# Patient Record
Sex: MALE | Race: WHITE
[De-identification: names, ages, dates, MRNs, and addresses within clinical notes are randomized per-mention and may not be internally consistent; named-entity substitution may affect disease eponyms.]

---

## 2018-08-13 ENCOUNTER — HOSPITAL ENCOUNTER (OUTPATIENT)
Dept: HOSPITAL 35 - ULTRA | Age: 38
Discharge: HOME | End: 2018-08-13
Attending: OTOLARYNGOLOGY
Payer: COMMERCIAL

## 2018-08-13 DIAGNOSIS — Z79.899: ICD-10-CM

## 2018-08-13 DIAGNOSIS — I10: ICD-10-CM

## 2018-08-13 DIAGNOSIS — D11.0: Primary | ICD-10-CM

## 2024-06-05 ENCOUNTER — OFFICE VISIT (OUTPATIENT)
Dept: CARDIOLOGY | Facility: CLINIC | Age: 44
End: 2024-06-05
Payer: COMMERCIAL

## 2024-06-05 VITALS
WEIGHT: 218.6 LBS | BODY MASS INDEX: 31.3 KG/M2 | HEIGHT: 70 IN | HEART RATE: 59 BPM | DIASTOLIC BLOOD PRESSURE: 88 MMHG | SYSTOLIC BLOOD PRESSURE: 120 MMHG

## 2024-06-05 DIAGNOSIS — E78.2 MIXED HYPERLIPIDEMIA: ICD-10-CM

## 2024-06-05 DIAGNOSIS — I10 ESSENTIAL HYPERTENSION: ICD-10-CM

## 2024-06-05 DIAGNOSIS — Z82.41 FAMILY HISTORY OF SUDDEN CARDIAC DEATH: Primary | ICD-10-CM

## 2024-06-05 PROCEDURE — 93000 ELECTROCARDIOGRAM COMPLETE: CPT | Performed by: INTERNAL MEDICINE

## 2024-06-05 PROCEDURE — 99204 OFFICE O/P NEW MOD 45 MIN: CPT | Performed by: INTERNAL MEDICINE

## 2024-06-05 RX ORDER — ATENOLOL 25 MG/1
1 TABLET ORAL DAILY
COMMUNITY
Start: 2024-04-12

## 2024-06-05 RX ORDER — SIMVASTATIN 20 MG
20 TABLET ORAL NIGHTLY
COMMUNITY

## 2024-06-05 RX ORDER — AMLODIPINE BESYLATE 5 MG/1
5 TABLET ORAL DAILY
COMMUNITY

## 2024-06-05 RX ORDER — ASPIRIN 81 MG/1
81 TABLET ORAL DAILY
COMMUNITY

## 2024-06-05 NOTE — PROGRESS NOTES
[facility]      Patient Name: Riki Arana  :10/2/1978  Age: 45 y.o.  Encounter Provider:  Ari Agarwal Jr, MD      Chief Complaint:   Chief Complaint   Patient presents with    FAMILY HISTORY OF MYOCARDIAL INFARCTION         HPI  Riki Arana is a 45 y.o. male with family history of presumed fatal MI with personal history of essential hypertension mixed hyperlipidemia who presents for initial evaluation.   patient's father  at home of presumed myocardial infarction.  He did not make it to the hospital and there was no autopsy to confirm.  He was a diabetic for 10 years prior to this and he has a history of paternal and maternal grand parents with MI in their 60s.  He coaches volleyball and plays weekly with no chest pain or shortness of air.  No orthopnea, PND or edema.  He had palpitations in his late 20s and was placed on atenolol and has no symptoms on the medication.  Blood pressure and heart rate are well-controlled today in clinic.  He is a lifelong non-smoker who drinks rarely and denies illicit drug use.      The following portions of the patient's history were reviewed and updated as appropriate: allergies, current medications, past family history, past medical history, past social history, past surgical history and problem list.      Review of Systems   Constitutional: Negative for chills and fever.   HENT:  Negative for hoarse voice and sore throat.    Eyes:  Negative for double vision and photophobia.   Cardiovascular:  Negative for chest pain, leg swelling, near-syncope, orthopnea, palpitations, paroxysmal nocturnal dyspnea and syncope.   Respiratory:  Negative for cough and wheezing.    Skin:  Negative for poor wound healing and rash.   Musculoskeletal:  Negative for arthritis and joint swelling.   Gastrointestinal:  Negative for bloating, abdominal pain, hematemesis and hematochezia.   Neurological:  Negative for dizziness and focal weakness.  "  Psychiatric/Behavioral:  Negative for depression and suicidal ideas.      OBJECTIVE:   Vital Signs  Vitals:    06/05/24 1048   BP: 120/88   Pulse: 59     Estimated body mass index is 31.37 kg/m² as calculated from the following:    Height as of this encounter: 177.8 cm (70\").    Weight as of this encounter: 99.2 kg (218 lb 9.6 oz).    Vitals reviewed.   Constitutional:       Appearance: Healthy appearance. Not in distress.   Neck:      Vascular: No JVR. JVD normal.   Pulmonary:      Effort: Pulmonary effort is normal.      Breath sounds: Normal breath sounds. No wheezing. No rhonchi. No rales.   Chest:      Chest wall: Not tender to palpatation.   Cardiovascular:      PMI at left midclavicular line. Normal rate. Regular rhythm. Normal S1. Normal S2.       Murmurs: There is no murmur.      No gallop.  No click. No rub.   Pulses:     Intact distal pulses.   Edema:     Peripheral edema absent.   Abdominal:      General: Bowel sounds are normal.      Palpations: Abdomen is soft.      Tenderness: There is no abdominal tenderness.   Musculoskeletal: Normal range of motion.         General: No tenderness. Skin:     General: Skin is warm and dry.   Neurological:      General: No focal deficit present.      Mental Status: Alert and oriented to person, place and time.       ECG 12 Lead    Date/Time: 6/5/2024 10:56 AM  Performed by: Ari Agarwal Jr., MD    Authorized by: Ari Agarwal Jr., MD  Comparison: not compared with previous ECG   Previous ECG: no previous ECG available  Rhythm: sinus rhythm  Other findings: non-specific ST-T wave changes    Clinical impression: non-specific ECG           No results found for: \"BUN\", \"CREATININE\", \"K\", \"ALT\", \"AST\"        ASSESSMENT:     43-year-old male with family history of sudden cardiac death presents for initial evaluation      PLAN OF CARE:     Family history of CAD/SCD -patient is concerned about his cardiac risk given his family history.  He has a normal EKG with no " concerning symptoms.  We will plan for coronary calcium score at [facility].  I would have a low threshold for treadmill stress testing if his calcium score is greater than 100.  This would also dictate a need for more aggressive cholesterol lowering with a target goal of less than 70 if greater than 100.  He is already on an aspirin which she started after COVID diagnosis and has continued.  If his calcium score is very low I do not think he meets criteria for aspirin therapy but we will evaluate once diagnostic testing is reported.  Essential hypertension -well-controlled at this time.  Continue current medications and sodium restricted diet.  Mixed hyperlipidemia -last LDL was 101.  If calcium score is greater than 100 we will push for more strict goals.    Return to clinic 12 months             Discharge Medications            Accurate as of June 5, 2024 10:55 AM. If you have any questions, ask your nurse or doctor.                Continue These Medications        Instructions Start Date   amLODIPine 5 MG tablet  Commonly known as: NORVASC   5 mg, Oral, Daily      aspirin 81 MG EC tablet   81 mg, Oral, Daily      atenolol 25 MG tablet  Commonly known as: TENORMIN   1 tablet, Oral, Daily      simvastatin 20 MG tablet  Commonly known as: ZOCOR   20 mg, Oral, Nightly               Thank you for allowing me to participate in the care of your patient,      Sincerely,   Ari Agarwal MD  [facility]  06/05/24  10:55 EDT

## 2024-08-09 ENCOUNTER — HOSPITAL ENCOUNTER (OUTPATIENT)
Dept: CT IMAGING | Facility: HOSPITAL | Age: 44
Discharge: HOME | End: 2024-08-09
Admitting: INTERNAL MEDICINE

## 2024-08-09 DIAGNOSIS — Z82.41 FAMILY HISTORY OF SUDDEN CARDIAC DEATH: ICD-10-CM

## 2024-08-09 PROCEDURE — 75571 CT HRT W/O DYE W/CA TEST: CPT

## 2024-08-12 ENCOUNTER — TELEPHONE (OUTPATIENT)
Dept: CARDIOLOGY | Facility: CLINIC | Age: 44
End: 2024-08-12
Payer: COMMERCIAL

## 2024-08-12 NOTE — TELEPHONE ENCOUNTER
----- Message from Ari Agarwal sent at 8/12/2024  3:43 PM EDT -----  Please let the patient know this is a low calcium score.  No indication for further testing at this time and no change in medications planned.

## 2024-08-12 NOTE — PROGRESS NOTES
Please let the patient know this is a low calcium score.  No indication for further testing at this time and no change in medications planned.

## 2024-08-12 NOTE — TELEPHONE ENCOUNTER
Left voicemail for Riki Arana requesting callback.    HUB: please transfer to Triage if patient returns call    Thank you,  Heather HYMAN RN  Triage Nurse [facility]   16:01 EDT

## 2024-08-12 NOTE — TELEPHONE ENCOUNTER
Riki Arana returned call.    Reviewed results and recommendations with patient and he verbalized understanding of results and recommendations.    Thank you,  Heather HYMAN RN  Triage Nurse [facility]   16:23 EDT

## 2025-06-10 ENCOUNTER — OFFICE VISIT (OUTPATIENT)
Dept: CARDIOLOGY | Age: 45
End: 2025-06-10
Payer: COMMERCIAL

## 2025-06-10 VITALS
SYSTOLIC BLOOD PRESSURE: 122 MMHG | WEIGHT: 223.8 LBS | DIASTOLIC BLOOD PRESSURE: 80 MMHG | HEART RATE: 57 BPM | HEIGHT: 70 IN | BODY MASS INDEX: 32.04 KG/M2

## 2025-06-10 DIAGNOSIS — I10 ESSENTIAL HYPERTENSION: ICD-10-CM

## 2025-06-10 DIAGNOSIS — E78.2 MIXED HYPERLIPIDEMIA: ICD-10-CM

## 2025-06-10 DIAGNOSIS — Z82.41 FAMILY HISTORY OF SUDDEN CARDIAC DEATH: Primary | ICD-10-CM

## 2025-06-10 RX ORDER — ESOMEPRAZOLE MAGNESIUM 20 MG/1
20 GRANULE, DELAYED RELEASE ORAL
COMMUNITY
Start: 2025-02-01

## 2025-06-10 NOTE — PROGRESS NOTES
[facility]      Patient Name: Riki Arana  :1980  Age: 44 y.o.  Encounter Provider:  Ari Agarwal Jr, MD      Chief Complaint:   Chief Complaint   Patient presents with    Family history of sudden cardiac death         HPI  Riki Arana is a 44 y.o. male with family history of presumed fatal MI with personal history of essential hypertension mixed hyperlipidemia who presents for fu evaluation.       Last clinic visit note: Patient's father  at home of presumed myocardial infarction.  He did not make it to the hospital and there was no autopsy to confirm.  He was a diabetic for 10 years prior to this and he has a history of paternal and maternal grand parents with MI in their 60s.  He coaches volleyball and plays weekly with no chest pain or shortness of air.  No orthopnea, PND or edema.  He had palpitations in his late 20s and was placed on atenolol and has no symptoms on the medication.  Blood pressure and heart rate are well-controlled today in clinic.  He is a lifelong non-smoker who drinks rarely and denies illicit drug use.    Coronary calcium score of 25.  No indication for further testing.  Patient remains on aspirin and statin.  He is active with no limiting complaints.  EKG today shows sinus rhythm with no concerning repolarization abnormalities.    The following portions of the patient's history were reviewed and updated as appropriate: allergies, current medications, past family history, past medical history, past social history, past surgical history and problem list.      Review of Systems   Constitutional: Negative for chills and fever.   HENT:  Negative for hoarse voice and sore throat.    Eyes:  Negative for double vision and photophobia.   Cardiovascular:  Negative for chest pain, leg swelling, near-syncope, orthopnea, palpitations, paroxysmal nocturnal dyspnea and syncope.   Respiratory:  Negative for cough and wheezing.    Skin:  Negative for  "poor wound healing and rash.   Musculoskeletal:  Negative for arthritis and joint swelling.   Gastrointestinal:  Negative for bloating, abdominal pain, hematemesis and hematochezia.   Neurological:  Negative for dizziness and focal weakness.   Psychiatric/Behavioral:  Negative for depression and suicidal ideas.        OBJECTIVE:   Vital Signs  Vitals:    06/10/25 1040   BP: 122/80   Pulse: 57     Estimated body mass index is 32.11 kg/m² as calculated from the following:    Height as of this encounter: 177.8 cm (70\").    Weight as of this encounter: 102 kg (223 lb 12.8 oz).    Vitals reviewed.   Constitutional:       Appearance: Healthy appearance. Not in distress.   Neck:      Vascular: No JVR. JVD normal.   Pulmonary:      Effort: Pulmonary effort is normal.      Breath sounds: Normal breath sounds. No wheezing. No rhonchi. No rales.   Chest:      Chest wall: Not tender to palpatation.   Cardiovascular:      PMI at left midclavicular line. Normal rate. Regular rhythm. Normal S1. Normal S2.       Murmurs: There is no murmur.      No gallop.  No click. No rub.   Pulses:     Intact distal pulses.   Edema:     Peripheral edema absent.   Abdominal:      General: Bowel sounds are normal.      Palpations: Abdomen is soft.      Tenderness: There is no abdominal tenderness.   Musculoskeletal: Normal range of motion.         General: No tenderness. Skin:     General: Skin is warm and dry.   Neurological:      General: No focal deficit present.      Mental Status: Alert and oriented to person, place and time.       ECG 12 Lead    Date/Time: 6/10/2025 10:46 AM  Performed by: Ari Agarwal Jr., MD    Authorized by: Ari Agarwal Jr., MD  Comparison: compared with previous ECG from 6/5/2024  Similar to previous ECG  Rhythm: sinus rhythm    Clinical impression: normal ECG           No results found for: \"BUN\", \"CREATININE\", \"K\", \"ALT\", \"AST\"        ASSESSMENT:     44-year-old male with family history of sudden cardiac death " presents for initial evaluation      PLAN OF CARE:     Family history of CAD/SCD -low calcium score.  Patient is already on aspirin and statin.  No indication for aggressive LDL lowering goals at this time.  I suggest we repeat calcium score in 5 years.  I offered the patient multiple follow-up strategies and he would prefer to call me if there are any new problems.  Essential hypertension -well-controlled at this time.  Continue current medications and sodium restricted diet.  Mixed hyperlipidemia -last LDL was 101.  If calcium score is greater than 100 we will push for more strict goals.    Patient would prefer to be seen as needed.  He will call with any questions or concerns.             Discharge Medications            Accurate as of Ronda 10, 2025 10:46 AM. If you have any questions, ask your nurse or doctor.                Continue These Medications        Instructions Start Date   amLODIPine 5 MG tablet  Commonly known as: NORVASC   5 mg, Daily      aspirin 81 MG EC tablet   81 mg, Daily      atenolol 25 MG tablet  Commonly known as: TENORMIN   1 tablet, Daily      METFORMIN HCL ER PO   500 mg, Daily      nexIUM 20 MG packet  Generic drug: esomeprazole   20 mg, Every Morning Before Breakfast      simvastatin 20 MG tablet  Commonly known as: ZOCOR   20 mg, Nightly               Thank you for allowing me to participate in the care of your patient,      Sincerely,   rAi Agarwal MD  [facility]  06/10/25  10:46 EDT

## 2025-06-10 NOTE — PROGRESS NOTES
RM:__________                    PCP:Hill, Essence J, PA-C                                                            Last EKG:    :_10/1980                                                                    AGE:44 y.o.      REASON FOR VISIT:____________________________________________________________________________        WT:____________    HT:____________   BP:____________  HR:____________       SMOKER: CURRENT/PPD:___________________ FORMER:______________ NEVER:______________          RECENT HOSPITALIZATIONS OR   ER VISITS:________________________________________________________